# Patient Record
Sex: MALE | Race: OTHER | NOT HISPANIC OR LATINO | Employment: OTHER | URBAN - METROPOLITAN AREA
[De-identification: names, ages, dates, MRNs, and addresses within clinical notes are randomized per-mention and may not be internally consistent; named-entity substitution may affect disease eponyms.]

---

## 2019-07-21 ENCOUNTER — HOSPITAL ENCOUNTER (EMERGENCY)
Facility: HOSPITAL | Age: 29
Discharge: HOME | End: 2019-07-22
Attending: EMERGENCY MEDICINE

## 2019-07-21 VITALS
HEART RATE: 89 BPM | HEIGHT: 68 IN | SYSTOLIC BLOOD PRESSURE: 138 MMHG | TEMPERATURE: 98 F | BODY MASS INDEX: 27.6 KG/M2 | WEIGHT: 182.13 LBS | RESPIRATION RATE: 18 BRPM | DIASTOLIC BLOOD PRESSURE: 84 MMHG | OXYGEN SATURATION: 97 %

## 2019-07-21 DIAGNOSIS — M25.561 RIGHT ANTERIOR KNEE PAIN: Primary | ICD-10-CM

## 2019-07-21 PROCEDURE — 29505 APPLICATION LONG LEG SPLINT: CPT

## 2019-07-21 PROCEDURE — 99284 EMERGENCY DEPT VISIT MOD MDM: CPT | Mod: 25

## 2019-07-21 RX ORDER — HYDROCODONE BITARTRATE AND ACETAMINOPHEN 10; 325 MG/1; MG/1
1 TABLET ORAL
Status: COMPLETED | OUTPATIENT
Start: 2019-07-22 | End: 2019-07-22

## 2019-07-22 PROCEDURE — 25000003 PHARM REV CODE 250: Performed by: NURSE PRACTITIONER

## 2019-07-22 RX ORDER — HYDROCODONE BITARTRATE AND ACETAMINOPHEN 10; 325 MG/1; MG/1
1 TABLET ORAL EVERY 4 HOURS PRN
Qty: 15 TABLET | Refills: 0 | Status: SHIPPED | OUTPATIENT
Start: 2019-07-22

## 2019-07-22 RX ORDER — NAPROXEN 500 MG/1
500 TABLET ORAL 2 TIMES DAILY WITH MEALS
Qty: 10 TABLET | Refills: 0 | Status: SHIPPED | OUTPATIENT
Start: 2019-07-22 | End: 2019-07-27

## 2019-07-22 RX ADMIN — HYDROCODONE BITARTRATE AND ACETAMINOPHEN 1 TABLET: 10; 325 TABLET ORAL at 12:07

## 2019-07-22 NOTE — DISCHARGE INSTRUCTIONS
Wear the knee immobilizer for the next week for comfort. Remove several times a day and do range of motion exercises. Follow RICE (information given in discharge instructions), and follow up in one week with your primary care doctor or Orthopedic doctor for reevaluation of right knee pain. Return immediately to ER for any worsening of pain or swelling, any redness appears at injury site, Fever of 100.4°F (38°C) or above lasting for 24 to 48 hours, the toes on the injured foot  become cold, blue, numb, or tingly, or for any concerns.

## 2019-07-22 NOTE — ED PROVIDER NOTES
History     Chief Complaint   Patient presents with    Knee Pain     right knee pain x 2 days. Trauma denied     Review of patient's allergies indicates:  No Known Allergies      History of Present Illness     HPI    7/22/2019, 12:02 AM  History obtained from the friend and patient      History of Present Illness: Harjeet Mcginnis is a 28 y.o. male patient who presents to the Emergency Department for evaluation of right knee pain. The Patient presents with knee pain and swelling involving the  right knee. Onset of the symptoms was yesterday. Inciting event: none known. Current symptoms include pain with walking or movement. Pain is aggravated by rising after sitting, standing and walking.  Patient has had no prior knee problems. The pt denies any other injuries.         Arrival mode: Personal vehicle    PCP: Primary Doctor No        Past Medical History:  No past medical history on file.    Past Surgical History:  No past surgical history on file.      Family History:  No family history on file.    Social History:  Social History     Tobacco Use    Smoking status: Current Every Day Smoker     Packs/day: 1.00     Years: 5.00     Pack years: 5.00     Types: Cigarettes   Substance and Sexual Activity    Alcohol use: No    Drug use: No    Sexual activity: Yes     Partners: Female     Birth control/protection: Condom        Review of Systems     Review of Systems   Constitutional: Negative for fever.   HENT: Negative for sore throat.    Respiratory: Negative for shortness of breath.    Cardiovascular: Negative for chest pain.   Gastrointestinal: Negative for nausea.   Genitourinary: Negative for dysuria.   Musculoskeletal: Positive for arthralgias and joint swelling. Negative for back pain.        Right knee pain    Skin: Negative for rash and wound.   Neurological: Negative for weakness.   Hematological: Does not bruise/bleed easily.        Physical Exam     Initial Vitals [07/21/19 2322]   BP Pulse Resp Temp SpO2  "  138/84 89 18 98.1 °F (36.7 °C) 97 %      MAP       --          Physical Exam   Constitutional: He is oriented to person, place, and time. He appears well-developed and well-nourished.   HENT:   Head: Atraumatic.   Eyes: Pupils are equal, round, and reactive to light. Conjunctivae and EOM are normal.   Neck: Normal range of motion. Neck supple.   Cardiovascular: Normal rate, regular rhythm, normal heart sounds and intact distal pulses. Exam reveals no gallop and no friction rub.   No murmur heard.  Pulmonary/Chest: Breath sounds normal. He has no wheezes. He has no rhonchi. He has no rales.   Abdominal: Soft. Bowel sounds are normal. He exhibits no distension. There is no tenderness. There is no rebound and no guarding.   Musculoskeletal:        Right knee: He exhibits decreased range of motion and swelling. He exhibits no deformity, no erythema, normal patellar mobility, no bony tenderness, normal meniscus and no MCL laxity. Tenderness found. MCL tenderness noted.        Legs:  Neurological: He is alert and oriented to person, place, and time. He has normal strength.   Skin: No rash noted.   Psychiatric: He has a normal mood and affect.     Nursing Notes and Vital Signs Reviewed.     ED Course   Procedures  ED Vital Signs:  Vitals:    07/21/19 2322   BP: 138/84   Pulse: 89   Resp: 18   Temp: 98.1 °F (36.7 °C)   TempSrc: Oral   SpO2: 97%   Weight: 82.6 kg (182 lb 1.6 oz)   Height: 5' 8" (1.727 m)       Abnormal Lab Results:  Labs Reviewed - No data to display     All Lab Results:  None     Imaging Results:  Imaging Results          X-Ray Knee 3 View Right (Final result)  Result time 07/22/19 08:30:50    Final result by Zach Connelly MD (07/22/19 08:30:50)                 Impression:      Negative exam.      Electronically signed by: Zach Connelly MD  Date:    07/22/2019  Time:    08:30             Narrative:    EXAMINATION:  XR KNEE 3 VIEW RIGHT    CLINICAL HISTORY:  Right knee pain x2 " days.,    TECHNIQUE:  Standard radiography performed.    COMPARISON:  None    FINDINGS:  Bone density and architecture are normal.  No acute findings.                                        The Emergency Provider reviewed the vital signs and test results, which are outlined above.     ED Discussion     1:05 AM: Reassessed pt at this time. Pt states their condition has improved at this time. Discussed with pt  And family no acute findings on xray. Discussed pt dx of right knee pain vs strain, follow up with PCP or Orthopedic. Also informed pt and family the xrays were pending final radiologist review and will call with any acute findings.   All questions and concerns were addressed at this time. Pt expresses understanding of information and instructions, and is comfortable with plan to discharge. Pt is stable for discharge.    I discussed with patient and/or family/caretaker that negative X-ray does not rule out occult fracture or other soft tissue injury.  Persistent pain greater than 7-10 days or increased pain requires follow up, specifically with orthopedics.      ED Medication(s):  Medications   HYDROcodone-acetaminophen  mg per tablet 1 tablet (1 tablet Oral Given 7/22/19 0010)       Discharge Medication List as of 7/22/2019  1:04 AM      START taking these medications    Details   HYDROcodone-acetaminophen (NORCO)  mg per tablet Take 1 tablet by mouth every 4 (four) hours as needed for Pain., Starting Mon 7/22/2019, Print      naproxen (NAPROSYN) 500 MG tablet Take 1 tablet (500 mg total) by mouth 2 (two) times daily with meals. for 5 days, Starting Mon 7/22/2019, Until Sat 7/27/2019, Print             Follow-up Information     Kiran Ballard Jr, MD In 2 days.    Specialty:  Orthopedic Surgery  Why:  Follow up with Orthopedic of your choice for further evaluation of right knee pain  Contact information:  [address] [place] [address] DR Harry MCCLOUD [id number] [phone]                         [facility]  Decision Making:   Clinical Tests:   Radiological Study: Ordered and Reviewed             Scribe Attestation:   Scribe #1: I performed the above scribed service and the documentation accurately describes the services I performed. I attest to the accuracy of the note.     Attending:   Physician Attestation Statement for Scribe #1: I, No att. providers found, personally performed the services described in this documentation, as scribed by Jessie Russell, in my presence, and it is both accurate and complete.           Clinical Impression       ICD-10-CM ICD-9-CM   1. Right anterior knee pain M25.561 719.46       Disposition:   Disposition: Discharged  Condition: Stable         Jessie Russell NP  07/24/19 0105

## 2022-01-21 ENCOUNTER — HOSPITAL ENCOUNTER (EMERGENCY)
Facility: HOSPITAL | Age: 32
Discharge: HOME | End: 2022-01-21
Attending: EMERGENCY MEDICINE

## 2022-01-21 VITALS
HEART RATE: 93 BPM | TEMPERATURE: 99 F | BODY MASS INDEX: 26.68 KG/M2 | SYSTOLIC BLOOD PRESSURE: 148 MMHG | OXYGEN SATURATION: 97 % | RESPIRATION RATE: 16 BRPM | WEIGHT: 176.06 LBS | HEIGHT: 68 IN | DIASTOLIC BLOOD PRESSURE: 82 MMHG

## 2022-01-21 DIAGNOSIS — G89.29 ACUTE EXACERBATION OF CHRONIC LOW BACK PAIN: ICD-10-CM

## 2022-01-21 DIAGNOSIS — U07.1 COVID-19 VIRUS INFECTION: Primary | ICD-10-CM

## 2022-01-21 DIAGNOSIS — M54.50 ACUTE EXACERBATION OF CHRONIC LOW BACK PAIN: ICD-10-CM

## 2022-01-21 LAB
INFLUENZA A, MOLECULAR: NEGATIVE
INFLUENZA B, MOLECULAR: NEGATIVE
SARS-COV-2 RDRP RESP QL NAA+PROBE: POSITIVE
SPECIMEN SOURCE: NORMAL

## 2022-01-21 PROCEDURE — 87502 INFLUENZA DNA AMP PROBE: CPT | Performed by: EMERGENCY MEDICINE

## 2022-01-21 PROCEDURE — U0002 COVID-19 LAB TEST NON-CDC: HCPCS | Performed by: EMERGENCY MEDICINE

## 2022-01-21 PROCEDURE — 99284 EMERGENCY DEPT VISIT MOD MDM: CPT | Mod: 25

## 2022-01-21 RX ORDER — DICLOFENAC SODIUM 50 MG/1
50 TABLET, DELAYED RELEASE ORAL 3 TIMES DAILY PRN
Qty: 15 TABLET | Refills: 0 | Status: SHIPPED | OUTPATIENT
Start: 2022-01-21

## 2022-01-21 RX ORDER — CYCLOBENZAPRINE HCL 10 MG
10 TABLET ORAL 3 TIMES DAILY PRN
Qty: 15 TABLET | Refills: 0 | Status: SHIPPED | OUTPATIENT
Start: 2022-01-21 | End: 2022-01-26

## 2022-01-24 NOTE — ED PROVIDER NOTES
HISTORY     Chief Complaint   Patient presents with    Fever     Pt presented to ED with c/o fever and weakness since 1700 hrs      Review of patient's allergies indicates:  No Known Allergies     HPI   The history is provided by the patient.   General Illness   The current episode started today. The problem occurs rarely. The problem has been unchanged. The pain is at a severity of 7/10. Nothing relieves the symptoms. Nothing aggravates the symptoms. Associated symptoms include a fever and muscle aches. Pertinent negatives include no nausea, no sore throat, no shortness of breath and no rash. He has received no recent medical care.      Patient wound also like medication for his chronic back pain    PCP: Primary Doctor No     Past Medical History:  No past medical history on file.     Past Surgical History:  No past surgical history on file.     Family History:  No family history on file.     Social History:  Social History     Tobacco Use    Smoking status: Current Every Day Smoker     Packs/day: 1.00     Years: 5.00     Pack years: 5.00     Types: Cigarettes    Smokeless tobacco: Not on file   Substance and Sexual Activity    Alcohol use: No    Drug use: No    Sexual activity: Yes     Partners: Female     Birth control/protection: Condom         ROS   Review of Systems   Constitutional: Positive for fatigue and fever.   HENT: Negative for sore throat.    Respiratory: Negative for shortness of breath.    Cardiovascular: Negative for chest pain.   Gastrointestinal: Negative for nausea.   Genitourinary: Negative for dysuria.   Musculoskeletal: Positive for back pain and myalgias.   Skin: Negative for rash.   Neurological: Positive for weakness.   Hematological: Does not bruise/bleed easily.       PHYSICAL EXAM     Initial Vitals [01/21/22 0122]   BP Pulse Resp Temp SpO2   (!) 148/82 93 16 99.1 °F (37.3 °C) 97 %      MAP       --           Physical Exam    Constitutional: He appears well-developed and  "well-nourished. No distress.   HENT:   Head: Normocephalic and atraumatic.   Eyes: Conjunctivae are normal. Pupils are equal, round, and reactive to light.   Neck: Neck supple.   Normal range of motion.  Cardiovascular: Normal rate, regular rhythm and normal heart sounds.   Pulmonary/Chest: Breath sounds normal.   Abdominal: Abdomen is soft. Bowel sounds are normal.   Musculoskeletal:         General: Normal range of motion.      Cervical back: Normal range of motion and neck supple.     Neurological: He is alert and oriented to person, place, and time. No cranial nerve deficit. Coordination and gait normal.   Skin: Skin is warm and dry.   Psychiatric: He has a normal mood and affect.          ED COURSE   Procedures  ED ONGOING VITALS:  Vitals:    01/21/22 0122   BP: (!) 148/82   Pulse: 93   Resp: 16   Temp: 99.1 °F (37.3 °C)   TempSrc: Oral   SpO2: 97%   Weight: 79.9 kg (176 lb 0.6 oz)   Height: 5' 8" (1.727 m)         ABNORMAL LAB VALUES:  Labs Reviewed   SARS-COV-2 RNA AMPLIFICATION, QUAL - Abnormal; Notable for the following components:       Result Value    SARS-CoV-2 RNA, Amplification, Qual Positive (*)     All other components within normal limits   INFLUENZA A & B BY MOLECULAR         ALL LAB VALUES:  Results for orders placed or performed during the hospital encounter of 01/21/22   Influenza A & B by Molecular    Specimen: Nasopharyngeal Swab   Result Value Ref Range    Influenza A, Molecular Negative Negative    Influenza B, Molecular Negative Negative    Flu A & B Source Nasal swab    COVID-19 Rapid Screening   Result Value Ref Range    SARS-CoV-2 RNA, Amplification, Qual Positive (A) Negative           RADIOLOGY STUDIES:  Imaging Results    None                   The above vital signs and test results have been reviewed by the emergency provider.     ED Medications:  Discharge Medication List as of 1/21/2022  2:20 AM      START taking these medications    Details   cyclobenzaprine (FLEXERIL) 10 MG " tablet Take 1 tablet (10 mg total) by mouth 3 (three) times daily as needed., Starting Fri 1/21/2022, Until Wed 1/26/2022 at 2359, Print      diclofenac (VOLTAREN) 50 MG EC tablet Take 1 tablet (50 mg total) by mouth 3 (three) times daily as needed., Starting Fri 1/21/2022, Print           Discharge Medications:  Discharge Medication List as of 1/21/2022  2:20 AM      START taking these medications    Details   cyclobenzaprine (FLEXERIL) 10 MG tablet Take 1 tablet (10 mg total) by mouth 3 (three) times daily as needed., Starting Fri 1/21/2022, Until Wed 1/26/2022 at 2359, Print      diclofenac (VOLTAREN) 50 MG EC tablet Take 1 tablet (50 mg total) by mouth 3 (three) times daily as needed., Starting Fri 1/21/2022, Print               I discussed with patient and/or family/caretaker that evaluation in the ED does not suggest any emergent or life threatening medical conditions requiring immediate intervention beyond what was provided in the ED, and I believe patient is safe for discharge. Regardless, an unremarkable evaluation in the ED does not preclude the development or presence of a serious or life threatening condition. As such, patient was instructed to return immediately for any worsening or change in current symptoms.        MEDICAL DECISION MAKING                 CLINICAL IMPRESSION       ICD-10-CM ICD-9-CM   1. COVID-19 virus infection  U07.1 079.89   2. Acute exacerbation of chronic low back pain  M54.50 724.2    G89.29 338.19     338.29       Disposition:   Disposition: Discharged  Condition: Stable         Vitor Gonzalez NP  01/24/22 0217